# Patient Record
Sex: MALE | HISPANIC OR LATINO
[De-identification: names, ages, dates, MRNs, and addresses within clinical notes are randomized per-mention and may not be internally consistent; named-entity substitution may affect disease eponyms.]

---

## 2019-03-19 ENCOUNTER — APPOINTMENT (OUTPATIENT)
Dept: INTERNAL MEDICINE | Facility: CLINIC | Age: 39
End: 2019-03-19
Payer: MEDICAID

## 2019-03-19 VITALS
WEIGHT: 160 LBS | DIASTOLIC BLOOD PRESSURE: 80 MMHG | HEIGHT: 67 IN | TEMPERATURE: 98.2 F | OXYGEN SATURATION: 99 % | SYSTOLIC BLOOD PRESSURE: 118 MMHG | RESPIRATION RATE: 16 BRPM | HEART RATE: 66 BPM | BODY MASS INDEX: 25.11 KG/M2

## 2019-03-19 DIAGNOSIS — M25.511 PAIN IN RIGHT SHOULDER: ICD-10-CM

## 2019-03-19 DIAGNOSIS — R68.89 OTHER GENERAL SYMPTOMS AND SIGNS: ICD-10-CM

## 2019-03-19 DIAGNOSIS — M25.562 PAIN IN LEFT KNEE: ICD-10-CM

## 2019-03-19 DIAGNOSIS — F32.9 MAJOR DEPRESSIVE DISORDER, SINGLE EPISODE, UNSPECIFIED: ICD-10-CM

## 2019-03-19 PROCEDURE — 99204 OFFICE O/P NEW MOD 45 MIN: CPT
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
## 2019-03-19 NOTE — ASSESSMENT
[FreeTextEntry1] : 1.right shoulder pain: Advised physical therapy referral at this time. Discussed take ibuprofen 600 mg as needed every 8 hours with food.\par \par 2.hypercholesterolemia: We'll need recent records of blood work. Continue low-cholesterol diet.\par \par 3.heat tolerance with history of swollen glands: Check thyroid levels and CBC. Offered retesting of HIV however, patient refused.\par \par 4.eye redness: Patient request for ophthalmology referral. Nothing seen on exam today.

## 2019-03-19 NOTE — HEALTH RISK ASSESSMENT
[1] : 1) Little interest or pleasure doing things for several days (1) [2] : 2) Feeling down, depressed, or hopeless for more than half of the days (2) [] : No [de-identified] : nonsmoker [de-identified] : occasionally  [de-identified] : no substance abuse [[id number]] : 3 [HIV Test offered] : HIV Test offered [Guns at Home] : no guns at home [HIVDate] : 12/18 [HIVComments] : negative, per pt.

## 2019-04-02 ENCOUNTER — OTHER (OUTPATIENT)
Age: 39
End: 2019-04-02

## 2019-04-02 LAB
BASOPHILS # BLD AUTO: 0.02 K/UL
BASOPHILS NFR BLD AUTO: 0.5 %
EOSINOPHIL # BLD AUTO: 0.05 K/UL
EOSINOPHIL NFR BLD AUTO: 1.4 %
HCT VFR BLD CALC: 48.2 %
HGB BLD-MCNC: 15.5 G/DL
IMM GRANULOCYTES NFR BLD AUTO: 0.3 %
LYMPHOCYTES # BLD AUTO: 1.91 K/UL
LYMPHOCYTES NFR BLD AUTO: 51.8 %
MAN DIFF?: NORMAL
MCHC RBC-ENTMCNC: 30.8 PG
MCHC RBC-ENTMCNC: 32.2 GM/DL
MCV RBC AUTO: 95.6 FL
MONOCYTES # BLD AUTO: 0.34 K/UL
MONOCYTES NFR BLD AUTO: 9.2 %
NEUTROPHILS # BLD AUTO: 1.36 K/UL
NEUTROPHILS NFR BLD AUTO: 36.8 %
PLATELET # BLD AUTO: 245 K/UL
RBC # BLD: 5.04 M/UL
RBC # FLD: 12.1 %
TSH SERPL-ACNC: 1.68 UIU/ML
WBC # FLD AUTO: 3.69 K/UL

## 2019-04-08 LAB — HIV1+2 AB SPEC QL IA.RAPID: NONREACTIVE

## 2019-05-28 ENCOUNTER — APPOINTMENT (OUTPATIENT)
Dept: INTERNAL MEDICINE | Facility: CLINIC | Age: 39
End: 2019-05-28
Payer: MEDICAID

## 2019-05-28 VITALS
BODY MASS INDEX: 22.96 KG/M2 | TEMPERATURE: 98.3 F | HEIGHT: 69 IN | HEART RATE: 80 BPM | SYSTOLIC BLOOD PRESSURE: 102 MMHG | RESPIRATION RATE: 18 BRPM | OXYGEN SATURATION: 97 % | WEIGHT: 155 LBS | DIASTOLIC BLOOD PRESSURE: 92 MMHG

## 2019-05-28 DIAGNOSIS — H10.30 UNSPECIFIED ACUTE CONJUNCTIVITIS, UNSPECIFIED EYE: ICD-10-CM

## 2019-05-28 PROCEDURE — 99213 OFFICE O/P EST LOW 20 MIN: CPT

## 2019-05-28 NOTE — HISTORY OF PRESENT ILLNESS
[FreeTextEntry8] : Pt presents to the office today with complaints of left eye yellow discharge and swollen lid. He also complains of " itching in the eye." He denies vision changes , pain or light sensitivity. He does not wear contact lenses. He was using Claritan OTC the last few days , thinking it was allergy related with little relief. \par

## 2019-05-28 NOTE — ASSESSMENT
[FreeTextEntry1] : Start Vigamox 1 gtt TID to affected eye\par Avoid  sharing towels, bedding , ect\par Zaditor gtts PRN for allergy symptoms \par If not improving, F/up with opthalmology

## 2019-05-28 NOTE — REVIEW OF SYSTEMS
[Chills] : no chills [Fever] : no fever [Fatigue] : no fatigue [Discharge] : discharge [Pain] : no pain [Redness] : redness [Vision Problems] : no vision problems [Itching] : itching [Negative] : Neurological [FreeTextEntry3] : see HPi

## 2019-05-28 NOTE — PHYSICAL EXAM
[Well Nourished] : well nourished [Well Developed] : well developed [No Acute Distress] : no acute distress [EOMI] : extraocular movements intact [PERRL] : pupils equal round and reactive to light [Supple] : supple [No Lymphadenopathy] : no lymphadenopathy [Clear to Auscultation] : lungs were clear to auscultation bilaterally [No Respiratory Distress] : no respiratory distress  [Normal Rate] : normal rate  [No Accessory Muscle Use] : no accessory muscle use [Normal S1, S2] : normal S1 and S2 [Regular Rhythm] : with a regular rhythm [de-identified] : left eye sclera/ conjunctiva normal.  lower eyelid slightly reddened , no discharge  noted

## 2019-05-30 ENCOUNTER — MEDICATION RENEWAL (OUTPATIENT)
Age: 39
End: 2019-05-30

## 2019-06-12 ENCOUNTER — APPOINTMENT (OUTPATIENT)
Dept: INTERNAL MEDICINE | Facility: CLINIC | Age: 39
End: 2019-06-12
Payer: MEDICAID

## 2019-06-12 VITALS
WEIGHT: 160 LBS | HEIGHT: 69 IN | BODY MASS INDEX: 23.7 KG/M2 | OXYGEN SATURATION: 98 % | HEART RATE: 73 BPM | RESPIRATION RATE: 18 BRPM | TEMPERATURE: 98.8 F | SYSTOLIC BLOOD PRESSURE: 110 MMHG | DIASTOLIC BLOOD PRESSURE: 74 MMHG

## 2019-06-12 DIAGNOSIS — L03.317 CELLULITIS OF BUTTOCK: ICD-10-CM

## 2019-06-12 DIAGNOSIS — H00.014 HORDEOLUM EXTERNUM LEFT UPPER EYELID: ICD-10-CM

## 2019-06-12 PROCEDURE — 99214 OFFICE O/P EST MOD 30 MIN: CPT

## 2019-06-14 NOTE — HISTORY OF PRESENT ILLNESS
[FreeTextEntry8] : Patient is a 39-year-old male with history of hypercholesterolemia comes in for an acute visit. He was in to see nurse practitioner a few weeks ago with conjunctivitis of the left eye. He was prescribed eye drops and states he used them and they worked well for his symptoms and he no longer has eye redness or discharge. For the last 3 days he's noticed some swelling in his left upper eyelid. He states it is not painful. Denies any blurry vision or loss of vision, discharge or redness. The states for the past one week he's noticed rash with bumps on his buttocks and above this area which is very pruritic. He usually uses hair cream removal in this area every other week.\par Patient denies any cp, sob,abdominal pain, nausea, vomiting, palpitations, fever, chills, constipation, diarrhea.\par

## 2019-06-14 NOTE — ASSESSMENT
[FreeTextEntry1] : 1.left upper eyelid hordeolum: Discussed treatment with warm compress. If does not resolve will need to see ophthalmology. No longer with conjunctivitis.\par Went over instructions and side effects of medication.\par \par 2.cellulitis: Discussed treatment Keflex, cold compress and to avoid scratching area. To avoid using hair removal cream until healed. All questions answered.\par

## 2019-07-09 ENCOUNTER — APPOINTMENT (OUTPATIENT)
Dept: INTERNAL MEDICINE | Facility: CLINIC | Age: 39
End: 2019-07-09
Payer: MEDICAID

## 2019-07-09 VITALS
DIASTOLIC BLOOD PRESSURE: 60 MMHG | TEMPERATURE: 98.5 F | HEART RATE: 76 BPM | RESPIRATION RATE: 16 BRPM | BODY MASS INDEX: 23.99 KG/M2 | SYSTOLIC BLOOD PRESSURE: 104 MMHG | WEIGHT: 162 LBS | HEIGHT: 69 IN | OXYGEN SATURATION: 97 %

## 2019-07-09 DIAGNOSIS — B35.1 TINEA UNGUIUM: ICD-10-CM

## 2019-07-09 DIAGNOSIS — H00.012 HORDEOLUM EXTERNUM RIGHT LOWER EYELID: ICD-10-CM

## 2019-07-09 PROCEDURE — 99214 OFFICE O/P EST MOD 30 MIN: CPT

## 2019-07-10 NOTE — ASSESSMENT
[FreeTextEntry1] : 1.right lower eyelid hordeolum: Discussed treatment with warm compresses. Given the names for ophthalmology followup. Discussed with patient that he does not have conjunctivitis and no eyedrops needed.\par \par 2.bilateral large toe onychomycoses: Given referral to podiatry. He has already tried over-the-counter treatment in the past.

## 2019-07-10 NOTE — HISTORY OF PRESENT ILLNESS
[FreeTextEntry8] : Mr. MARY BRICEÑO is a 39 year M who comes in for an acute visit.\par Patient states that the left eye stye has completely resolved. He started having symptoms of a stye in his right lower eyelid a few days ago. He notes increased amounts of swelling and redness. He restarted back on the ofloxacin eyedrops which did help with some of the symptoms as well as treatment with warm compress.Patient denies any discharge or redness of the right cornea, blurry vision, loss of vision, eye pain.\par Patient notes a long history of toenail fungus in his large toes. He states back in his home country of [place] he saw many podiatrists and has had nail removals in the past and treatments with creams and laquers. He did discuss oral medication but never tried it.\par His cellulitis is also resolved.\par Patient denies any cp, sob,abdominal pain, nausea, vomiting, palpitations, fever, chills, constipation, diarrhea.\par

## 2019-12-12 ENCOUNTER — APPOINTMENT (OUTPATIENT)
Dept: INTERNAL MEDICINE | Facility: CLINIC | Age: 39
End: 2019-12-12
Payer: MEDICAID

## 2019-12-12 VITALS
HEART RATE: 63 BPM | TEMPERATURE: 98.1 F | WEIGHT: 162 LBS | HEIGHT: 69 IN | BODY MASS INDEX: 23.99 KG/M2 | SYSTOLIC BLOOD PRESSURE: 124 MMHG | OXYGEN SATURATION: 98 % | RESPIRATION RATE: 18 BRPM | DIASTOLIC BLOOD PRESSURE: 82 MMHG

## 2019-12-12 DIAGNOSIS — H00.13 CHALAZION RIGHT EYE, UNSPECIFIED EYELID: ICD-10-CM

## 2019-12-12 PROCEDURE — 99214 OFFICE O/P EST MOD 30 MIN: CPT

## 2019-12-12 NOTE — PHYSICAL EXAM
[Normal Sclera/Conjunctiva] : normal sclera/conjunctiva [Normal] : normal rate, regular rhythm, normal S1 and S2 and no murmur heard [de-identified] : red hard bump to inside of right lower lid with a tiny white dot on lid margin [de-identified] : Mobile hard bump under skin to left upper posterior thigh under buttock about the size of a nickel. Palpated upon raising of the leg. No erythema, rash or lesions to area.

## 2019-12-12 NOTE — PLAN
[FreeTextEntry1] : Discussed with patient to do warm compresses and lid hygiene using baby shampoo or OTC lid scrubs every day. \par Patient told to f/u with his ophthalmologist.  \par \par Discussed with patient that the bump on the left upper thigh under buttock may be an ingrown hair or sebaceous cyst. Referred to dermatology Lorenzo Pearson or Janet.\par Patient offered blood work including CBC and HIV testing. Patient declined.\par \par Discussed with Dr. Moore.

## 2019-12-12 NOTE — HISTORY OF PRESENT ILLNESS
[FreeTextEntry8] : Patient comes in today with c/o a small pimple on the lower lid of the right eyelid since July. He had gone to the ophthalmologist in July and was told to continue warm compresses and lid hygiene using baby shampoo but it did not resolve. He states it is now a painless hard bump. He says he hasn’t been using the warm compresses every night or using the baby shampoo lid scrubs every night. He denies discharge, itching, burning, tearing, or vision loss. \par He also is complaining of a bump under his skin on the back of his left leg under his left buttock. He states he has felt it while applying lotion for about two months now especially when he raises his left leg. He denies shaving the area or applying hair removal cream. He denies any rashes or pimples to the area.

## 2020-01-13 ENCOUNTER — APPOINTMENT (OUTPATIENT)
Dept: DERMATOLOGY | Facility: CLINIC | Age: 40
End: 2020-01-13
Payer: MEDICAID

## 2020-01-13 VITALS — HEIGHT: 69 IN | WEIGHT: 162 LBS | BODY MASS INDEX: 23.99 KG/M2

## 2020-01-13 DIAGNOSIS — L72.3 SEBACEOUS CYST: ICD-10-CM

## 2020-01-13 DIAGNOSIS — L85.3 XEROSIS CUTIS: ICD-10-CM

## 2020-01-13 PROCEDURE — 99203 OFFICE O/P NEW LOW 30 MIN: CPT

## 2020-03-17 ENCOUNTER — APPOINTMENT (OUTPATIENT)
Dept: INTERNAL MEDICINE | Facility: CLINIC | Age: 40
End: 2020-03-17
Payer: MEDICAID

## 2020-03-17 VITALS
SYSTOLIC BLOOD PRESSURE: 126 MMHG | HEART RATE: 68 BPM | TEMPERATURE: 98.5 F | DIASTOLIC BLOOD PRESSURE: 70 MMHG | RESPIRATION RATE: 16 BRPM | OXYGEN SATURATION: 99 % | HEIGHT: 68 IN | WEIGHT: 160 LBS | BODY MASS INDEX: 24.25 KG/M2

## 2020-03-17 DIAGNOSIS — J30.89 OTHER ALLERGIC RHINITIS: ICD-10-CM

## 2020-03-17 PROCEDURE — 99214 OFFICE O/P EST MOD 30 MIN: CPT

## 2020-03-17 NOTE — ASSESSMENT
[FreeTextEntry1] : 1.allergic rhinitis with postnasal drip: Start on Flonase nasal spray twice daily with loratadine once daily.\par All questions answered.\par \par 2.dry skin: Discussed moisturizer use on a daily basis-Cetpahil/Cera Ve. \par F/u with Derm.

## 2020-03-17 NOTE — HISTORY OF PRESENT ILLNESS
[FreeTextEntry8] : Patient is a 40-year-old male coming in with complaints of itchy throat with sensation to clear throat, postnasal drip and sneezing. About 1-1/2 months ago he had a mild cough with sneezing with rhinorrhea. He took TheraFlu and Claritin and then felt better. Currently he denies any fever or cough. He's not had any recent travel outside of the state or country. No sick contacts. Currently working at a salon.\par Patient denies any cp, sob,abdominal pain, nausea, vomiting, palpitations, chills, constipation.\par Patient having increased amounts of anxiety and stress as he's moving forward with his divorce proceedings. He's had gone off diarrhea which she relates to his stress last episode was 3 days ago.

## 2020-11-03 ENCOUNTER — APPOINTMENT (OUTPATIENT)
Dept: INTERNAL MEDICINE | Facility: CLINIC | Age: 40
End: 2020-11-03
Payer: MEDICAID

## 2020-11-03 VITALS
HEART RATE: 64 BPM | WEIGHT: 157 LBS | OXYGEN SATURATION: 97 % | RESPIRATION RATE: 16 BRPM | DIASTOLIC BLOOD PRESSURE: 70 MMHG | BODY MASS INDEX: 23.79 KG/M2 | HEIGHT: 68 IN | TEMPERATURE: 96.6 F | SYSTOLIC BLOOD PRESSURE: 112 MMHG

## 2020-11-03 DIAGNOSIS — E78.00 PURE HYPERCHOLESTEROLEMIA, UNSPECIFIED: ICD-10-CM

## 2020-11-03 DIAGNOSIS — D72.819 DECREASED WHITE BLOOD CELL COUNT, UNSPECIFIED: ICD-10-CM

## 2020-11-03 PROCEDURE — G0008: CPT

## 2020-11-03 PROCEDURE — 90686 IIV4 VACC NO PRSV 0.5 ML IM: CPT

## 2020-11-03 PROCEDURE — 99396 PREV VISIT EST AGE 40-64: CPT | Mod: 25

## 2020-11-03 RX ORDER — FLUTICASONE FUROATE 27.5 UG/1
27.5 SPRAY, METERED NASAL
Qty: 1 | Refills: 3 | Status: DISCONTINUED | COMMUNITY
Start: 2020-03-17 | End: 2020-11-03

## 2020-11-03 RX ORDER — ALCOHOL 62 ML/100ML
10 LIQUID TOPICAL
Qty: 90 | Refills: 0 | Status: DISCONTINUED | COMMUNITY
Start: 2020-03-17 | End: 2020-11-03

## 2020-11-04 NOTE — HISTORY OF PRESENT ILLNESS
[FreeTextEntry1] : Patient comes in for an annual physical exam.\par  [de-identified] : Patient is a 40-year-old male coming in for annual exam. Patient is happy that his divorce is final and he was able to weakly have his name changed last week. He would like rhiannon medication for good dentist as he's not had a full dental exam in some time. He also like to have the influenza vaccine today.\par Patient denies any cp, sob,abdominal pain, nausea, vomiting, palpitations, fever, chills, constipation, diarrhea.\par

## 2020-11-04 NOTE — ASSESSMENT
[FreeTextEntry1] : 1.health maintenance: Discussed blood work to obtain, influenza vaccine administered today. Advised patient to speak with his insurance regarding dentist referral.\par \par 2.leukopenia: Recheck CBC.\par \par 3.urethral pain: Given referral to urology.\par \par 4.hypercholesterolemia: Recheck lipids.

## 2020-11-04 NOTE — HEALTH RISK ASSESSMENT
[Yes] : Yes [2 - 4 times a month (2 pts)] : 2-4 times a month (2 points) [1 or 2 (0 pts)] : 1 or 2 (0 points) [Never (0 pts)] : Never (0 points) [No] : In the past 12 months have you used drugs other than those required for medical reasons? No [0] : 2) Feeling down, depressed, or hopeless: Not at all (0) [Employed] : employed [Smoke Detector] : smoke detector [Carbon Monoxide Detector] : carbon monoxide detector [Safety elements used in home] : safety elements used in home [Seat Belt] :  uses seat belt [Sunscreen] : uses sunscreen [] : No [[id number]] : 0 [Guns at Home] : no guns at home [FreeTextEntry2] :

## 2020-11-10 ENCOUNTER — LABORATORY RESULT (OUTPATIENT)
Age: 40
End: 2020-11-10

## 2020-11-24 LAB
ALBUMIN SERPL ELPH-MCNC: 4.7 G/DL
ALP BLD-CCNC: 62 U/L
ALT SERPL-CCNC: 34 U/L
ANION GAP SERPL CALC-SCNC: 12 MMOL/L
APPEARANCE: ABNORMAL
AST SERPL-CCNC: 26 U/L
BACTERIA: NEGATIVE
BASOPHILS # BLD AUTO: 0.03 K/UL
BASOPHILS NFR BLD AUTO: 0.9 %
BILIRUB SERPL-MCNC: 1.1 MG/DL
BILIRUBIN URINE: NEGATIVE
BLOOD URINE: NEGATIVE
BUN SERPL-MCNC: 18 MG/DL
CALCIUM SERPL-MCNC: 9.2 MG/DL
CHLORIDE SERPL-SCNC: 101 MMOL/L
CHOLEST SERPL-MCNC: 173 MG/DL
CO2 SERPL-SCNC: 27 MMOL/L
COLOR: YELLOW
CREAT SERPL-MCNC: 1.17 MG/DL
EOSINOPHIL # BLD AUTO: 0.03 K/UL
EOSINOPHIL NFR BLD AUTO: 0.9 %
ESTIMATED AVERAGE GLUCOSE: 105 MG/DL
GLUCOSE QUALITATIVE U: NEGATIVE
GLUCOSE SERPL-MCNC: 89 MG/DL
HBA1C MFR BLD HPLC: 5.3 %
HCT VFR BLD CALC: 45.6 %
HDLC SERPL-MCNC: 52 MG/DL
HGB BLD-MCNC: 15.4 G/DL
HYALINE CASTS: 0 /LPF
IMM GRANULOCYTES NFR BLD AUTO: 0.3 %
KETONES URINE: NEGATIVE
LDLC SERPL CALC-MCNC: 102 MG/DL
LEUKOCYTE ESTERASE URINE: NEGATIVE
LYMPHOCYTES # BLD AUTO: 1.91 K/UL
LYMPHOCYTES NFR BLD AUTO: 59 %
MAN DIFF?: NORMAL
MCHC RBC-ENTMCNC: 32.5 PG
MCHC RBC-ENTMCNC: 33.8 GM/DL
MCV RBC AUTO: 96.2 FL
MICROSCOPIC-UA: NORMAL
MONOCYTES # BLD AUTO: 0.31 K/UL
MONOCYTES NFR BLD AUTO: 9.6 %
NEUTROPHILS # BLD AUTO: 0.95 K/UL
NEUTROPHILS NFR BLD AUTO: 29.3 %
NITRITE URINE: NEGATIVE
NONHDLC SERPL-MCNC: 121 MG/DL
PH URINE: 7.5
PLATELET # BLD AUTO: 233 K/UL
POTASSIUM SERPL-SCNC: 4.5 MMOL/L
PROT SERPL-MCNC: 7.5 G/DL
PROTEIN URINE: NORMAL
PSA SERPL-MCNC: 0.37 NG/ML
RBC # BLD: 4.74 M/UL
RBC # FLD: 11.9 %
RED BLOOD CELLS URINE: 2 /HPF
SARS-COV-2 IGG SERPL IA-ACNC: <0.1 INDEX
SARS-COV-2 IGG SERPL QL IA: NEGATIVE
SODIUM SERPL-SCNC: 140 MMOL/L
SPECIFIC GRAVITY URINE: 1.02
SQUAMOUS EPITHELIAL CELLS: 0 /HPF
TRIGL SERPL-MCNC: 95 MG/DL
TSH SERPL-ACNC: 1.85 UIU/ML
UROBILINOGEN URINE: NORMAL
WBC # FLD AUTO: 3.24 K/UL
WHITE BLOOD CELLS URINE: 1 /HPF

## 2020-11-27 ENCOUNTER — NON-APPOINTMENT (OUTPATIENT)
Age: 40
End: 2020-11-27

## 2020-12-18 LAB — HIV1+2 AB SPEC QL IA.RAPID: NONREACTIVE

## 2020-12-21 ENCOUNTER — NON-APPOINTMENT (OUTPATIENT)
Age: 40
End: 2020-12-21

## 2021-03-24 ENCOUNTER — APPOINTMENT (OUTPATIENT)
Dept: INTERNAL MEDICINE | Facility: CLINIC | Age: 41
End: 2021-03-24
Payer: MEDICAID

## 2021-03-24 ENCOUNTER — LABORATORY RESULT (OUTPATIENT)
Age: 41
End: 2021-03-24

## 2021-03-24 VITALS
HEART RATE: 76 BPM | DIASTOLIC BLOOD PRESSURE: 70 MMHG | TEMPERATURE: 96.1 F | HEIGHT: 68 IN | RESPIRATION RATE: 16 BRPM | SYSTOLIC BLOOD PRESSURE: 120 MMHG | BODY MASS INDEX: 24.55 KG/M2 | OXYGEN SATURATION: 96 % | WEIGHT: 162 LBS

## 2021-03-24 DIAGNOSIS — Z72.51 HIGH RISK HETEROSEXUAL BEHAVIOR: ICD-10-CM

## 2021-03-24 PROCEDURE — 99214 OFFICE O/P EST MOD 30 MIN: CPT

## 2021-03-24 PROCEDURE — 99072 ADDL SUPL MATRL&STAF TM PHE: CPT

## 2021-03-25 NOTE — HISTORY OF PRESENT ILLNESS
[FreeTextEntry1] : FU [de-identified] : Patient is a 41-year-old male, MSM, comes in for follow up visit. Patient notes he's had more than 3 partners over the last 6 months. Notes having oral and anal intercourse giving and receiving and uses protection. He would like to have STI testing today. No symptoms of rashes, penile discharge or dysuria.\par Patient denies any cp, sob,abdominal pain, nausea, vomiting, palpitations, fever, chills, constipation, diarrhea.\par

## 2021-03-25 NOTE — ASSESSMENT
[FreeTextEntry1] : 1.health maintenance: Discussed all STI testing to be done, Patient counseled regarding recommendations for vaccines, seat belt safety, distracted driving, safe sex practices, diet and exercise and all preventative screening.\par

## 2021-03-29 ENCOUNTER — NON-APPOINTMENT (OUTPATIENT)
Age: 41
End: 2021-03-29

## 2021-03-29 LAB
C TRACH RRNA SPEC QL NAA+PROBE: NOT DETECTED
HAV IGM SER QL: NONREACTIVE
HBV CORE IGM SER QL: NONREACTIVE
HBV SURFACE AG SER QL: NONREACTIVE
HCV AB SER QL: NONREACTIVE
HCV S/CO RATIO: 0.07 S/CO
HIV1+2 AB SPEC QL IA.RAPID: NONREACTIVE
N GONORRHOEA RRNA SPEC QL NAA+PROBE: NOT DETECTED
SARS-COV-2 N GENE NPH QL NAA+PROBE: NOT DETECTED
SOURCE AMPLIFICATION: NORMAL
T PALLIDUM AB SER QL IA: POSITIVE

## 2021-04-11 ENCOUNTER — OUTPATIENT (OUTPATIENT)
Dept: OUTPATIENT SERVICES | Facility: HOSPITAL | Age: 41
LOS: 1 days | End: 2021-04-11
Payer: MEDICAID

## 2021-04-11 DIAGNOSIS — Z20.828 CONTACT WITH AND (SUSPECTED) EXPOSURE TO OTHER VIRAL COMMUNICABLE DISEASES: ICD-10-CM

## 2021-04-11 LAB — SARS-COV-2 RNA SPEC QL NAA+PROBE: SIGNIFICANT CHANGE UP

## 2021-04-11 PROCEDURE — U0003: CPT

## 2021-04-11 PROCEDURE — U0005: CPT

## 2021-04-11 PROCEDURE — C9803: CPT

## 2021-04-12 DIAGNOSIS — Z20.828 CONTACT WITH AND (SUSPECTED) EXPOSURE TO OTHER VIRAL COMMUNICABLE DISEASES: ICD-10-CM

## 2021-06-03 ENCOUNTER — APPOINTMENT (OUTPATIENT)
Dept: INTERNAL MEDICINE | Facility: CLINIC | Age: 41
End: 2021-06-03
Payer: MEDICAID

## 2021-06-03 VITALS
TEMPERATURE: 97 F | HEIGHT: 68 IN | RESPIRATION RATE: 16 BRPM | BODY MASS INDEX: 23.95 KG/M2 | OXYGEN SATURATION: 97 % | SYSTOLIC BLOOD PRESSURE: 120 MMHG | HEART RATE: 62 BPM | DIASTOLIC BLOOD PRESSURE: 78 MMHG | WEIGHT: 158 LBS

## 2021-06-03 PROCEDURE — 99213 OFFICE O/P EST LOW 20 MIN: CPT

## 2021-06-10 NOTE — ASSESSMENT
[FreeTextEntry1] : 1.diarrhea: Discussed stool testing to obtain. Continue with probiotic and discussed brat diet.\par \par 2.syphilis: Continue follow up with [facility] for help for her penicillin injection treatments, status post first injection yesterday.

## 2021-07-02 LAB
BACTERIA STL CULT: NORMAL
C DIFF TOX GENS STL QL NAA+PROBE: NORMAL
CDIFF BY PCR: NOT DETECTED
DEPRECATED O AND P PREP STL: NORMAL
G LAMBLIA AG STL QL: NORMAL
H PYLORI AG STL QL: DETECTED

## 2021-07-07 ENCOUNTER — NON-APPOINTMENT (OUTPATIENT)
Age: 41
End: 2021-07-07

## 2021-08-13 ENCOUNTER — APPOINTMENT (OUTPATIENT)
Dept: INTERNAL MEDICINE | Facility: CLINIC | Age: 41
End: 2021-08-13
Payer: MEDICAID

## 2021-08-13 VITALS
OXYGEN SATURATION: 98 % | RESPIRATION RATE: 16 BRPM | TEMPERATURE: 98.8 F | BODY MASS INDEX: 23.95 KG/M2 | WEIGHT: 158 LBS | HEIGHT: 68 IN | HEART RATE: 89 BPM | DIASTOLIC BLOOD PRESSURE: 66 MMHG | SYSTOLIC BLOOD PRESSURE: 130 MMHG

## 2021-08-13 VITALS — DIASTOLIC BLOOD PRESSURE: 72 MMHG | SYSTOLIC BLOOD PRESSURE: 128 MMHG

## 2021-08-13 DIAGNOSIS — A53.9 SYPHILIS, UNSPECIFIED: ICD-10-CM

## 2021-08-13 DIAGNOSIS — A04.8 OTHER SPECIFIED BACTERIAL INTESTINAL INFECTIONS: ICD-10-CM

## 2021-08-13 PROCEDURE — 99213 OFFICE O/P EST LOW 20 MIN: CPT

## 2021-08-13 RX ORDER — CLARITHROMYCIN 500 MG/1
500 TABLET, FILM COATED ORAL
Qty: 28 | Refills: 0 | Status: DISCONTINUED | COMMUNITY
Start: 2021-07-02 | End: 2021-08-13

## 2021-08-13 RX ORDER — OMEPRAZOLE 20 MG/1
20 CAPSULE, DELAYED RELEASE ORAL TWICE DAILY
Qty: 28 | Refills: 0 | Status: DISCONTINUED | COMMUNITY
Start: 2021-07-02 | End: 2021-08-13

## 2021-08-13 RX ORDER — AMOXICILLIN 500 MG/1
500 TABLET, FILM COATED ORAL
Qty: 56 | Refills: 0 | Status: DISCONTINUED | COMMUNITY
Start: 2021-07-02 | End: 2021-08-13

## 2021-08-13 NOTE — HISTORY OF PRESENT ILLNESS
[FreeTextEntry1] : FU [de-identified] : MARY TILLEY is a 41 year M who comes in for a follow up visit.\par Pt completed h pylori treatment with amoxicillin, clarithromycin and omeprazole. \par He completed the pcn injections for syphilis too. \par He has high stress at work. \par Patient denies any cp, sob,abdominal pain, nausea, vomiting, palpitations, fever, chills, constipation, diarrhea.\par

## 2021-08-13 NOTE — ASSESSMENT
[FreeTextEntry1] : 1.H. pylori infection: Check urea breath test for eradication, he is status post treatment triple therapy tx.\par \par 2.syphilis: Status post 3 injections of benzathine penicillin.

## 2021-08-27 LAB — UREA BREATH TEST QL: NEGATIVE

## 2021-08-30 ENCOUNTER — NON-APPOINTMENT (OUTPATIENT)
Age: 41
End: 2021-08-30

## 2021-11-19 ENCOUNTER — APPOINTMENT (OUTPATIENT)
Dept: INTERNAL MEDICINE | Facility: CLINIC | Age: 41
End: 2021-11-19
Payer: MEDICAID

## 2021-11-19 VITALS
HEART RATE: 73 BPM | TEMPERATURE: 98.1 F | DIASTOLIC BLOOD PRESSURE: 72 MMHG | WEIGHT: 160 LBS | OXYGEN SATURATION: 98 % | HEIGHT: 68 IN | BODY MASS INDEX: 24.25 KG/M2 | SYSTOLIC BLOOD PRESSURE: 120 MMHG

## 2021-11-19 DIAGNOSIS — J35.8 OTHER CHRONIC DISEASES OF TONSILS AND ADENOIDS: ICD-10-CM

## 2021-11-19 DIAGNOSIS — Z80.42 FAMILY HISTORY OF MALIGNANT NEOPLASM OF PROSTATE: ICD-10-CM

## 2021-11-19 PROCEDURE — 90686 IIV4 VACC NO PRSV 0.5 ML IM: CPT

## 2021-11-19 PROCEDURE — G0008: CPT

## 2021-11-19 PROCEDURE — 99214 OFFICE O/P EST MOD 30 MIN: CPT | Mod: 25

## 2021-11-19 NOTE — ASSESSMENT
[FreeTextEntry1] : 1.health maintenance: Normal prostate exam today, advised yearly.\par Given referral to go back to [facility]par Influenza vaccine administered today. Went over side effects of vaccine.\par Patient counseled regarding recommendations for vaccines, seat belt safety, distracted driving, safe sex practices, diet and exercise and all preventative screening.\par \par 2.tonsil stone: likely diagnosis, discussed preventative treatment and hygiene. If reoccurs may need to see ENT.\par \par

## 2021-11-19 NOTE — HISTORY OF PRESENT ILLNESS
[FreeTextEntry1] : FU [de-identified] : MARY TILLEY is a 41 year M who comes in for a follow up visit.\par Pt notes "white balls" in his throat about 2 weeks ago, relieved by lemon gargle. \par He has had 2 new sexual partners in the last 3 mo and has had oral intercourse. \par He also wishes for test for prostate exam today. His uncle had prostate cancer and  of this. \par Continues to have stress at work and has frequent headaches with this stress. \par Patient denies any cp, sob,abdominal pain, nausea, vomiting, palpitations, fever, chills, constipation, diarrhea.\par

## 2022-05-23 ENCOUNTER — NON-APPOINTMENT (OUTPATIENT)
Age: 42
End: 2022-05-23

## 2022-05-24 ENCOUNTER — APPOINTMENT (OUTPATIENT)
Dept: INTERNAL MEDICINE | Facility: CLINIC | Age: 42
End: 2022-05-24
Payer: MEDICAID

## 2022-05-24 VITALS
SYSTOLIC BLOOD PRESSURE: 134 MMHG | DIASTOLIC BLOOD PRESSURE: 84 MMHG | HEART RATE: 79 BPM | HEIGHT: 68 IN | BODY MASS INDEX: 24.86 KG/M2 | OXYGEN SATURATION: 99 % | WEIGHT: 164 LBS | TEMPERATURE: 97.9 F

## 2022-05-24 DIAGNOSIS — Z87.898 PERSONAL HISTORY OF OTHER SPECIFIED CONDITIONS: ICD-10-CM

## 2022-05-24 PROCEDURE — 99213 OFFICE O/P EST LOW 20 MIN: CPT

## 2022-05-24 NOTE — ASSESSMENT
[FreeTextEntry1] : 1.sinusitis: Respiratory viral panel obtained, advised patient to quarantine until results available.  Discussed starting on Medrol Dosepak for symptom relief, Went over instructions and side effects of medication.\par Increase fluids, rest. \par Take Tylenol 500 mg 1-2 tabs as needed every 8 hours for pain. \par Call for new or worsening symptoms.\par

## 2022-05-25 ENCOUNTER — NON-APPOINTMENT (OUTPATIENT)
Age: 42
End: 2022-05-25

## 2022-05-25 LAB
RAPID RVP RESULT: DETECTED
RV+EV RNA SPEC QL NAA+PROBE: DETECTED
SARS-COV-2 RNA PNL RESP NAA+PROBE: NOT DETECTED

## 2022-08-23 ENCOUNTER — APPOINTMENT (OUTPATIENT)
Dept: INTERNAL MEDICINE | Facility: CLINIC | Age: 42
End: 2022-08-23

## 2022-08-23 VITALS
TEMPERATURE: 98.4 F | DIASTOLIC BLOOD PRESSURE: 72 MMHG | WEIGHT: 162 LBS | SYSTOLIC BLOOD PRESSURE: 112 MMHG | HEIGHT: 68 IN | BODY MASS INDEX: 24.55 KG/M2 | HEART RATE: 76 BPM | OXYGEN SATURATION: 98 %

## 2022-08-23 PROCEDURE — 99212 OFFICE O/P EST SF 10 MIN: CPT

## 2022-08-23 RX ORDER — METHYLPREDNISOLONE 4 MG/1
4 TABLET ORAL
Qty: 1 | Refills: 0 | Status: DISCONTINUED | COMMUNITY
Start: 2022-05-24 | End: 2022-08-23

## 2022-08-23 NOTE — ASSESSMENT
[FreeTextEntry1] : 1.sinusitis: Obtain COVID nasopharyngeal swab and patient advised to quarantine until results are available.  Start on Medrol Dosepak. Went over instructions and side effects of medication.\par Increase fluids, rest. \par Take Tylenol 500 mg 1-2 tabs as needed every 8 hours for pain. \par Call for new or worsening symptoms.\par

## 2022-08-23 NOTE — HISTORY OF PRESENT ILLNESS
[FreeTextEntry1] : FU [de-identified] : MARY TILLEY is a 42 year M who comes in for a follow up visit.\par Pt went notes he went to [place] last week, and about 3 days ago he feels runny nose with clear/yellow discharge and sinus pressure and now cough with yellow sputum. He had fever/chills one night but did not check temperature last wed. He did take otc sinus medicine with mild relief of symptoms. He did do a home covid test on Thursday that was negative.  He is currently sexually active with males and has had recent multiple partners.  He has no complaints of rash on the body or genitalia.\par Patient denies any chest pain, shortness of breath, headache, abdominal pain, nausea, vomiting, palpitations, constipation, diarrhea or loss of sense of taste/smell.\par \par

## 2022-08-24 ENCOUNTER — APPOINTMENT (OUTPATIENT)
Dept: INTERNAL MEDICINE | Facility: CLINIC | Age: 42
End: 2022-08-24

## 2022-08-24 DIAGNOSIS — U07.1 COVID-19: ICD-10-CM

## 2022-08-24 LAB — SARS-COV-2 N GENE NPH QL NAA+PROBE: DETECTED

## 2022-08-24 PROCEDURE — 99441: CPT

## 2022-08-29 PROBLEM — U07.1 COVID-19 VIRUS INFECTION: Status: ACTIVE | Noted: 2022-08-29

## 2022-12-29 ENCOUNTER — APPOINTMENT (OUTPATIENT)
Dept: FAMILY MEDICINE | Facility: CLINIC | Age: 42
End: 2022-12-29
Payer: MEDICAID

## 2022-12-29 ENCOUNTER — NON-APPOINTMENT (OUTPATIENT)
Age: 42
End: 2022-12-29

## 2022-12-29 VITALS
DIASTOLIC BLOOD PRESSURE: 80 MMHG | HEIGHT: 68 IN | TEMPERATURE: 100.2 F | SYSTOLIC BLOOD PRESSURE: 130 MMHG | OXYGEN SATURATION: 98 % | BODY MASS INDEX: 24.25 KG/M2 | HEART RATE: 77 BPM | WEIGHT: 160 LBS

## 2022-12-29 DIAGNOSIS — R09.89 OTHER SPECIFIED SYMPTOMS AND SIGNS INVOLVING THE CIRCULATORY AND RESPIRATORY SYSTEMS: ICD-10-CM

## 2022-12-29 DIAGNOSIS — R09.82 POSTNASAL DRIP: ICD-10-CM

## 2022-12-29 DIAGNOSIS — R09.81 NASAL CONGESTION: ICD-10-CM

## 2022-12-29 PROCEDURE — 99213 OFFICE O/P EST LOW 20 MIN: CPT

## 2022-12-29 PROCEDURE — 87804 INFLUENZA ASSAY W/OPTIC: CPT | Mod: QW

## 2022-12-30 PROBLEM — R09.81 NASAL CONGESTION: Status: ACTIVE | Noted: 2022-05-24

## 2022-12-30 PROBLEM — R09.89 LYMPH NODE SYMPTOM: Status: ACTIVE | Noted: 2019-03-19

## 2022-12-30 LAB
FLUAV SPEC QL CULT: NEGATIVE
FLUBV AG SPEC QL IA: NEGATIVE

## 2022-12-30 NOTE — HISTORY OF PRESENT ILLNESS
[FreeTextEntry8] : MARY TILLEY is a 42 year old male presenting with abdominal pain and diarrhea x 8 days. Head congestion and cough at night.\par Would like to be tested for Flu and COVID.

## 2022-12-30 NOTE — PLAN
[FreeTextEntry1] : Start with clear liquids, advanced to full liquids when tolerated, then advance to BRAT diet when tolerated.\par Counseled on symptomatic management (rest, hydrate, Tylenol/Advil as needed, nasal saline spray, warm tea, raw honey).Return if no improvement noted.\par \par

## 2023-02-13 LAB — SARS-COV-2 N GENE NPH QL NAA+PROBE: NOT DETECTED

## 2023-06-25 RX ORDER — METHYLPREDNISOLONE 4 MG/1
4 TABLET ORAL
Qty: 1 | Refills: 0 | Status: COMPLETED | COMMUNITY
Start: 2022-08-23 | End: 2023-06-25

## 2023-06-27 ENCOUNTER — APPOINTMENT (OUTPATIENT)
Dept: INTERNAL MEDICINE | Facility: CLINIC | Age: 43
End: 2023-06-27
Payer: MEDICAID

## 2023-06-27 VITALS
DIASTOLIC BLOOD PRESSURE: 82 MMHG | BODY MASS INDEX: 25.16 KG/M2 | HEIGHT: 68 IN | SYSTOLIC BLOOD PRESSURE: 118 MMHG | OXYGEN SATURATION: 98 % | WEIGHT: 166 LBS | TEMPERATURE: 98.7 F | HEART RATE: 71 BPM | RESPIRATION RATE: 16 BRPM

## 2023-06-27 DIAGNOSIS — H57.89 OTHER SPECIFIED DISORDERS OF EYE AND ADNEXA: ICD-10-CM

## 2023-06-27 DIAGNOSIS — Z00.00 ENCOUNTER FOR GENERAL ADULT MEDICAL EXAMINATION W/OUT ABNORMAL FINDINGS: ICD-10-CM

## 2023-06-27 DIAGNOSIS — R39.89 OTHER SYMPTOMS AND SIGNS INVOLVING THE GENITOURINARY SYSTEM: ICD-10-CM

## 2023-06-27 DIAGNOSIS — Z87.09 PERSONAL HISTORY OF OTHER DISEASES OF THE RESPIRATORY SYSTEM: ICD-10-CM

## 2023-06-27 DIAGNOSIS — Z87.898 PERSONAL HISTORY OF OTHER SPECIFIED CONDITIONS: ICD-10-CM

## 2023-06-27 DIAGNOSIS — R05.9 COUGH, UNSPECIFIED: ICD-10-CM

## 2023-06-27 DIAGNOSIS — J02.9 ACUTE PHARYNGITIS, UNSPECIFIED: ICD-10-CM

## 2023-06-27 DIAGNOSIS — Z23 ENCOUNTER FOR IMMUNIZATION: ICD-10-CM

## 2023-06-27 PROCEDURE — 99214 OFFICE O/P EST MOD 30 MIN: CPT | Mod: 25

## 2023-06-27 PROCEDURE — 99396 PREV VISIT EST AGE 40-64: CPT | Mod: 25

## 2023-06-27 NOTE — PHYSICAL EXAM
[No Acute Distress] : no acute distress [Well Nourished] : well nourished [Well Developed] : well developed [Normal Sclera/Conjunctiva] : normal sclera/conjunctiva [PERRL] : pupils equal round and reactive to light [EOMI] : extraocular movements intact [Supple] : supple [No Respiratory Distress] : no respiratory distress  [No Accessory Muscle Use] : no accessory muscle use [Clear to Auscultation] : lungs were clear to auscultation bilaterally [Normal Rate] : normal rate  [Regular Rhythm] : with a regular rhythm [Normal S1, S2] : normal S1 and S2 [Pedal Pulses Present] : the pedal pulses are present [No Extremity Clubbing/Cyanosis] : no extremity clubbing/cyanosis [Non Tender] : non-tender [Normal Bowel Sounds] : normal bowel sounds [Coordination Grossly Intact] : coordination grossly intact [No Focal Deficits] : no focal deficits [Normal Gait] : normal gait [Normal Affect] : the affect was normal [Alert and Oriented x3] : oriented to person, place, and time [Normal Insight/Judgement] : insight and judgment were intact [de-identified] : posterior pharynx + erythema , no exudate

## 2023-06-27 NOTE — ASSESSMENT
[FreeTextEntry1] : 1. pharyngitis, dry cough \par \par COVID PCR  obtained\par throat culture sent \par increase fluids , Tylenol PRN \par pt instructed to self isolate until results are back \par \par 2. health maintenance\par Do FBW- will notify of results \par Dental exam q 6 months\par EYe exam yearly\par Skin exam yearly\par Pt counseled re" recommendations for vaccines,  seat belt safety , and diet and exercise and all preventative screening. \par \par f/up with results \par \par \par

## 2023-06-27 NOTE — HEALTH RISK ASSESSMENT
[2 - 4 times a month (2 pts)] : 2-4 times a month (2 points) [1 or 2 (0 pts)] : 1 or 2 (0 points) [0] : 2) Feeling down, depressed, or hopeless: Not at all (0) [PHQ-2 Negative - No further assessment needed] : PHQ-2 Negative - No further assessment needed [Audit-CScore] : 2 [[id number]] : 0 [HIV test declined] : HIV test declined [Hepatitis C test declined] : Hepatitis C test declined [Single] : single [High Risk Behavior] : high risk behavior [Fully functional (bathing, dressing, toileting, transferring, walking, feeding)] : Fully functional (bathing, dressing, toileting, transferring, walking, feeding) [Fully functional (using the telephone, shopping, preparing meals, housekeeping, doing laundry, using] : Fully functional and needs no help or supervision to perform IADLs (using the telephone, shopping, preparing meals, housekeeping, doing laundry, using transportation, managing medications and managing finances) [

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
## 2023-06-28 ENCOUNTER — NON-APPOINTMENT (OUTPATIENT)
Age: 43
End: 2023-06-28

## 2023-06-28 LAB
INFLUENZA A RESULT: NOT DETECTED
INFLUENZA B RESULT: NOT DETECTED
RESP SYN VIRUS RESULT: NOT DETECTED
SARS-COV-2 RESULT: NOT DETECTED

## 2023-06-29 ENCOUNTER — NON-APPOINTMENT (OUTPATIENT)
Age: 43
End: 2023-06-29

## 2023-06-29 LAB — BACTERIA THROAT CULT: NORMAL

## 2023-12-12 ENCOUNTER — NON-APPOINTMENT (OUTPATIENT)
Age: 43
End: 2023-12-12

## 2024-05-08 ENCOUNTER — NON-APPOINTMENT (OUTPATIENT)
Age: 44
End: 2024-05-08